# Patient Record
Sex: FEMALE | Race: WHITE | NOT HISPANIC OR LATINO | Employment: FULL TIME | URBAN - METROPOLITAN AREA
[De-identification: names, ages, dates, MRNs, and addresses within clinical notes are randomized per-mention and may not be internally consistent; named-entity substitution may affect disease eponyms.]

---

## 2023-05-18 ENCOUNTER — OFFICE VISIT (OUTPATIENT)
Dept: OBSTETRICS AND GYNECOLOGY | Facility: CLINIC | Age: 23
End: 2023-05-18

## 2023-05-18 VITALS
DIASTOLIC BLOOD PRESSURE: 82 MMHG | BODY MASS INDEX: 32.76 KG/M2 | SYSTOLIC BLOOD PRESSURE: 122 MMHG | WEIGHT: 178 LBS | HEIGHT: 62 IN

## 2023-05-18 DIAGNOSIS — Z11.3 SCREEN FOR STD (SEXUALLY TRANSMITTED DISEASE): Primary | ICD-10-CM

## 2023-05-18 DIAGNOSIS — Z76.89 ENCOUNTER TO ESTABLISH CARE WITH NEW DOCTOR: ICD-10-CM

## 2023-05-18 DIAGNOSIS — Z30.017 ENCOUNTER FOR INITIAL PRESCRIPTION OF IMPLANTABLE SUBDERMAL CONTRACEPTIVE: ICD-10-CM

## 2023-05-18 LAB
B-HCG UR QL: NEGATIVE
BILIRUB BLD-MCNC: NEGATIVE MG/DL
CLARITY, POC: CLEAR
COLOR UR: YELLOW
EXPIRATION DATE: NORMAL
GLUCOSE UR STRIP-MCNC: NEGATIVE MG/DL
INTERNAL NEGATIVE CONTROL: NORMAL
INTERNAL POSITIVE CONTROL: NORMAL
KETONES UR QL: NEGATIVE
LEUKOCYTE EST, POC: NEGATIVE
Lab: NORMAL
NITRITE UR-MCNC: NEGATIVE MG/ML
PH UR: 5 [PH] (ref 5–8)
PROT UR STRIP-MCNC: NEGATIVE MG/DL
RBC # UR STRIP: NEGATIVE /UL
SP GR UR: 1 (ref 1–1.03)
UROBILINOGEN UR QL: NORMAL

## 2023-05-18 NOTE — PROGRESS NOTES
New GYN Exam    CC- Here for discussion of birth control and to establish care    Alondra Montoya is a 22 y.o. female new patient who presents for discussion of birth control and to establish care. She is on OCPS now and has tried several types. Her first one caused BTB and her second one caused low libido and weight gain. Periods are regular every 28-30 days, lasting 6 days. She is SA with one partner. They do not use condoms. She is most interested in Nexplanon.     OB History        0    Para   0    Term   0       0    AB   0    Living   0       SAB   0    IAB   0    Ectopic   0    Molar   0    Multiple   0    Live Births   0          Obstetric Comments   No plans             Menarche: 14  Current contraception: OCP (estrogen/progesterone)  History of abnormal Pap smear: never had one  History of abnormal mammogram: never had one  Family history of uterine, colon or ovarian cancer: no  Family history of breast cancer: yes - 2 patneral great aunts  H/o STDs: none  Last pap:never  Gardasil:completed  JORGE: none    Health Maintenance   Topic Date Due   • HPV VACCINES (1 - 2-dose series) Never done   • COVID-19 Vaccine (3 - Booster for Moderna series) 2021   • TDAP/TD VACCINES (1 - Tdap) Never done   • HEPATITIS C SCREENING  Never done   • ANNUAL PHYSICAL  Never done   • INFLUENZA VACCINE  2023   • CHLAMYDIA SCREENING  2024   • MENINGOCOCCAL VACCINE  Completed   • Pneumococcal Vaccine 0-64  Aged Out       History reviewed. No pertinent past medical history.    Past Surgical History:   Procedure Laterality Date   • WISDOM TOOTH EXTRACTION           Current Outpatient Medications:   •  Cryselle-28 0.3-30 MG-MCG per tablet, , Disp: , Rfl:     No Known Allergies    Social History     Tobacco Use   • Smoking status: Never   • Smokeless tobacco: Never   Vaping Use   • Vaping Use: Never used   Substance Use Topics   • Alcohol use: Never   • Drug use: Never       Family History   Problem Relation  "Age of Onset   • Breast cancer Neg Hx    • Ovarian cancer Neg Hx    • Uterine cancer Neg Hx    • Colon cancer Neg Hx    • Deep vein thrombosis Neg Hx    • Pulmonary embolism Neg Hx    • Birth defects Neg Hx    • Mental retardation Neg Hx        Review of Systems   Constitutional: Positive for activity change. Negative for appetite change, fatigue, fever and unexpected weight change.   Eyes: Negative for photophobia and visual disturbance.   Respiratory: Negative for cough and shortness of breath.    Cardiovascular: Negative for chest pain and palpitations.   Gastrointestinal: Negative for abdominal distention, abdominal pain, constipation, diarrhea and nausea.   Endocrine: Negative for cold intolerance and heat intolerance.   Genitourinary: Negative for dyspareunia, dysuria, menstrual problem, pelvic pain, vaginal bleeding, vaginal discharge and vaginal pain.   Musculoskeletal: Negative for back pain.   Skin: Negative for color change and rash.   Neurological: Negative for headaches.   Hematological: Negative for adenopathy. Does not bruise/bleed easily.   Psychiatric/Behavioral: Negative for dysphoric mood. The patient is nervous/anxious.        /82   Ht 157.5 cm (62\")   Wt 80.7 kg (178 lb)   LMP 05/01/2023   Breastfeeding No   BMI 32.56 kg/m²     Physical Exam  Vitals and nursing note reviewed. Exam conducted with a chaperone present.   Constitutional:       Appearance: Normal appearance. She is well-developed.   HENT:      Head: Normocephalic and atraumatic.   Eyes:      General: No scleral icterus.     Conjunctiva/sclera: Conjunctivae normal.   Neck:      Thyroid: No thyromegaly.   Cardiovascular:      Rate and Rhythm: Normal rate and regular rhythm.   Pulmonary:      Effort: Pulmonary effort is normal.      Breath sounds: Normal breath sounds.   Abdominal:      General: Bowel sounds are normal. There is no distension.      Palpations: Abdomen is soft. There is no mass.      Tenderness: There is no " abdominal tenderness. There is no guarding or rebound.      Hernia: No hernia is present.   Musculoskeletal:      Cervical back: Neck supple.   Skin:     General: Skin is warm and dry.   Neurological:      Mental Status: She is alert and oriented to person, place, and time.   Psychiatric:         Mood and Affect: Mood normal.         Behavior: Behavior normal.         Thought Content: Thought content normal.         Judgment: Judgment normal.               Assessment/Plan  1) Visit to establish care.   2) GYN HM: plan age 23  SBE demonstrated and encouraged.  3) STD screening: declines Condoms encouraged. Check urine C/G/T  4) Contraception: Discussed with patient at length risk, benefits and alternatives to all contraceptive options, including oral contraceptive pills (both combination and progesterone only), vaginal rings, patches, Dep Provera, condoms, diaphragm, cervical caps, as well as long active but reversible forms such as Nexplanon and all IUD’s.  Differences in birth control and cycle control between methods were outlined along with correct usage for each method.  After discussion, the patient is most interest in Nexplanon. R/B/A of Nexplanon were discussed including DUB side effect and weight gain. Will order and place on cycle  5) Family Planning: family planning: no plans at present , encourage folic acid daily  6) Diet and Exercise discussed  7) Smoking Status: No  8) Social: no issues  9) MMG- plan age 42  10)Follow up prn or 1 year       Diagnoses and all orders for this visit:    1. Screen for STD (sexually transmitted disease) (Primary)  -     POC Urinalysis Dipstick  -     POC Pregnancy, Urine  -     Chlamydia trachomatis, Neisseria gonorrhoeae, Trichomonas vaginalis, PCR - Urine, Urine, Random Void    2. Encounter to establish care with new doctor    3. Encounter for initial prescription of implantable subdermal contraceptive          Kellen Junior MD  05/18/2023  12:19 EDT

## 2023-05-22 LAB
C TRACH RRNA SPEC QL NAA+PROBE: NEGATIVE
N GONORRHOEA RRNA SPEC QL NAA+PROBE: NEGATIVE
T VAGINALIS RRNA SPEC QL NAA+PROBE: NEGATIVE

## 2023-06-01 NOTE — PROGRESS NOTES
Nexplanon covered under medical benefits, awaiting device shipment. Once delivered I will call the patient to schedule

## 2023-06-19 ENCOUNTER — OFFICE VISIT (OUTPATIENT)
Dept: OBSTETRICS AND GYNECOLOGY | Facility: CLINIC | Age: 23
End: 2023-06-19
Payer: COMMERCIAL

## 2023-06-19 VITALS
HEIGHT: 62 IN | BODY MASS INDEX: 33.49 KG/M2 | WEIGHT: 182 LBS | SYSTOLIC BLOOD PRESSURE: 132 MMHG | DIASTOLIC BLOOD PRESSURE: 82 MMHG

## 2023-06-19 DIAGNOSIS — Z13.89 SCREENING FOR GENITOURINARY CONDITION: ICD-10-CM

## 2023-06-19 DIAGNOSIS — Z30.017 NEXPLANON INSERTION: Primary | ICD-10-CM

## 2023-06-19 LAB
B-HCG UR QL: NEGATIVE
EXPIRATION DATE: NORMAL
INTERNAL NEGATIVE CONTROL: NORMAL
INTERNAL POSITIVE CONTROL: NORMAL
Lab: NORMAL

## 2023-06-19 PROCEDURE — 11981 INSERTION DRUG DLVR IMPLANT: CPT | Performed by: OBSTETRICS & GYNECOLOGY

## 2023-06-19 PROCEDURE — 81025 URINE PREGNANCY TEST: CPT | Performed by: OBSTETRICS & GYNECOLOGY

## 2023-06-19 NOTE — PROGRESS NOTES
Nexplanon Insertion:    Procedures      Pre Dx: 1) Nexplanon Insertion   Post Dx: 1) Nexplanon Insertion  Urine pregnancy test was negative.  Patient is on her cycle.   Pt currently uses:OCP (estrogen/progesterone)    Risks, benefits, alternatives explained. All questions answered. Consents signed.  The area for insertion prepped with betadine in a sterile fashion. About 3 mL of 1% lidocaine.     The insertion site was identified approximately 6-8 cm proximal to the elbow crease in the underside of the left upper  arm overlying the groove between the biceps and triceps muscles. The skin at the insertion site was stretched by my thumb and index finger. Then inserted the needle tip, bevel side up, at an angle not greater than 20° to the skin surface, just until the skin was penetrated. The applicator was then lowered so that it was parallel to the arm. To minimize the chance of deep incision, the skin was tented upwards with the tip of the needle. The needle was then gently inserted to the full length. Then fixed the device in place on the arm with one hand. I then slowly and carefully retracted the needle cannula back along the length of the device after pushing the release button. The obturator was seen in the device to determine that the nexplanon had been released.     Both the patient and I were easily able to feel the device under the skin. Steri-Strips were applied at the site, and the arm was gently wrapped with gauze. Pt instructed to keep bandage on for 12-24 hrs.    There were no complications.   The patient tolerated the procedure well.     She was given a reminder card. She was advised to use condoms as a backup method for at least a week after insertion.    Expectations, warning signs and limitations reviewed.     Kellen Junior MD    6/19/2023  14:04 EDT

## 2024-06-17 ENCOUNTER — TELEPHONE (OUTPATIENT)
Dept: OBSTETRICS AND GYNECOLOGY | Facility: CLINIC | Age: 24
End: 2024-06-17

## 2024-06-17 NOTE — TELEPHONE ENCOUNTER
Caller: Alondra Montoya    Relationship to patient: Self    Best call back number: [phone]    Patient is needing:      PATIENT CALLED AND CANCELED SAME DAY APPT FOR TODAY.

## 2024-07-03 ENCOUNTER — OFFICE VISIT (OUTPATIENT)
Dept: OBSTETRICS AND GYNECOLOGY | Facility: CLINIC | Age: 24
End: 2024-07-03
Payer: COMMERCIAL

## 2024-07-03 VITALS
WEIGHT: 159 LBS | DIASTOLIC BLOOD PRESSURE: 78 MMHG | BODY MASS INDEX: 29.26 KG/M2 | HEIGHT: 62 IN | SYSTOLIC BLOOD PRESSURE: 124 MMHG

## 2024-07-03 DIAGNOSIS — Z01.419 PAP SMEAR, AS PART OF ROUTINE GYNECOLOGICAL EXAMINATION: ICD-10-CM

## 2024-07-03 DIAGNOSIS — Z30.013 ENCOUNTER FOR PRESCRIPTION FOR PROGESTIN-ONLY INJECTED CONTRACEPTIVE: ICD-10-CM

## 2024-07-03 DIAGNOSIS — Z11.3 ROUTINE SCREENING FOR STI (SEXUALLY TRANSMITTED INFECTION): ICD-10-CM

## 2024-07-03 DIAGNOSIS — Z97.5 NEXPLANON IN PLACE: ICD-10-CM

## 2024-07-03 DIAGNOSIS — N92.0 MENORRHAGIA WITH REGULAR CYCLE: ICD-10-CM

## 2024-07-03 DIAGNOSIS — Z01.419 ROUTINE GYNECOLOGICAL EXAMINATION: Primary | ICD-10-CM

## 2024-07-03 PROBLEM — N94.6 DYSMENORRHEA: Status: ACTIVE | Noted: 2024-07-03

## 2024-07-03 PROCEDURE — 99395 PREV VISIT EST AGE 18-39: CPT | Performed by: NURSE PRACTITIONER

## 2024-07-03 PROCEDURE — 99213 OFFICE O/P EST LOW 20 MIN: CPT | Performed by: NURSE PRACTITIONER

## 2024-07-03 RX ORDER — DROSPIRENONE 4 MG/1
1 TABLET, FILM COATED ORAL DAILY
Qty: 28 TABLET | Refills: 2 | COMMUNITY
Start: 2024-07-03

## 2024-07-03 NOTE — PROGRESS NOTES
GYN Annual Exam     Chief Complaint   Patient presents with    Gynecologic Exam       Alondra Montoya is a 23 y.o. female who presents for annual well woman exam. She is new to me - yes, but is an established patient of this practice. She is currently not sexually active; uses condoms when is SA. Currently using Nexplanon (placed 2023) for contraception. She is happy with her contraception: No.  No concerns for several months after Nexplanon was placed but has recently reverted back to how cycles were while on the pill; pain with heavy bleeding.  Originally started on ocp for dysmenorrhea; caused weight gain, low libido and BTB.    Periods are irregular, lasting unknown days. Dysmenorrhea: moderate, occurring throughout cycle. Cyclic symptoms include none.   Performing SBE: yes    Family hx of thyroid disease; desires labs.     HPI    OB History          0    Para   0    Term   0       0    AB   0    Living   0         SAB   0    IAB   0    Ectopic   0    Molar   0    Multiple   0    Live Births   0          Obstetric Comments   No plans               Last Pap: first one today  Last Mammogram: N/A  Last Colonoscopy: N/A  History of abnormal Pap smear:  N/A  Family history of uterine, colon or ovarian cancer: no  Family history of breast cancer: yes - paternal great-aunt x 2  History of abnormal mammogram:  N/A  Gardasil Vaccine: unsure    History reviewed. No pertinent past medical history.    Past Surgical History:   Procedure Laterality Date    WISDOM TOOTH EXTRACTION           Current Outpatient Medications:     Drospirenone (Slynd) 4 MG tablet, Take 1 tablet by mouth Daily., Disp: 28 tablet, Rfl: 2    Etonogestrel (Nexplanon) 68 MG implant subdermal implant, Inject 1 each into the appropriate area of the skin as directed by provider 1 (One) Time., Disp: , Rfl:     No Known Allergies    Social History     Tobacco Use    Smoking status: Never    Smokeless tobacco: Never   Vaping Use    Vaping status:  "Never Used   Substance Use Topics    Alcohol use: Never    Drug use: Never       Family History   Problem Relation Age of Onset    Thyroid disease Paternal Grandmother     Breast cancer Paternal Aunt     Ovarian cancer Neg Hx     Uterine cancer Neg Hx     Colon cancer Neg Hx     Deep vein thrombosis Neg Hx     Pulmonary embolism Neg Hx     Birth defects Neg Hx     Mental retardation Neg Hx        Review of Systems   Genitourinary:  Positive for menstrual problem and pelvic pain. Negative for breast discharge, breast lump and breast pain.         Yes-  tested for chlamydia    /78   Ht 157.5 cm (62\")   Wt 72.1 kg (159 lb)   LMP 06/15/2024   BMI 29.08 kg/m²     Physical Exam  Vitals reviewed.   Constitutional:       General: She is awake. She is not in acute distress.     Appearance: She is not ill-appearing.   HENT:      Head: Normocephalic and atraumatic.   Eyes:      Conjunctiva/sclera: Conjunctivae normal.   Pulmonary:      Effort: Pulmonary effort is normal. No respiratory distress.   Chest:   Breasts:     Right: Normal.      Left: Normal.   Abdominal:      Palpations: Abdomen is soft. There is no mass.      Tenderness: There is no abdominal tenderness.   Genitourinary:     General: Normal vulva.      Exam position: Supine.      Pubic Area: No rash.       Labia:         Right: No rash.         Left: No rash.       Urethra: No urethral lesion.      Vagina: Normal.      Cervix: Normal.      Uterus: Normal.       Adnexa: Right adnexa normal and left adnexa normal.   Musculoskeletal:      Cervical back: Neck supple. No rigidity.   Lymphadenopathy:      Upper Body:      Right upper body: No axillary adenopathy.      Left upper body: No axillary adenopathy.      Lower Body: No right inguinal adenopathy. No left inguinal adenopathy.   Skin:     General: Skin is warm and dry.      Capillary Refill: Capillary refill takes less than 2 seconds.   Neurological:      Mental Status: She is alert and oriented to " person, place, and time.   Psychiatric:         Mood and Affect: Mood and affect normal.         Behavior: Behavior normal.            Assessment     Well woman exam   Contraception management  STI screen  menorrhagia    Plan   Well woman exam: Pap collected- Yes. Instructed on how to perform SBE. Recommend MVI daily.    Breast Health: Clinical breast exam & Self breast awareness.   Colon health:  colonoscopy due at 47 y.o.  Contraception: abstinence  STD: Enc condoms. STD screen today- Yes. Pap   Gardasil: unsure if she received; Discussed with patient risks, benefits and alternatives to the Gardasil vaccination.  The vaccine is administered in the arm in a series of 3 shots at 0, 2 and 6 months.  It provides a 70% to 90% reduction in HPV related diseases such as abnormal Pap smears, genital warts and cervical cancer.  The vaccine was originally approved for ages 9-26, but is recently been expanded to the age of 45.  The most common side effects are pain at the injection site and fainting.  Any and all adverse side effects are tracked by the FDA and are available on their website for review.  After consideration, the patient is undecided.   Smoking status:  No  Body mass index is 29.08 kg/m². Diet and exercised discussed.  9.    Menorrhagia- check thyroid labs; Discussed other options for birth control/dysmenorrhea- not interested in combined ocp or Depo due to incidence of weight gain.  Does not want IUD at this time.  Samples provided of Slynd; Nexplanon in place; recommend to return to office in 3 months for f/u and remove Nexplanon    Follow-up as needed and 3 months for re-evaluation/Nexplanon removal.    I spent 15 minutes caring for Alondra on this date of service. This time includes time spent by me in the following activities: preparing for the visit, reviewing tests, performing a medically appropriate examination and/or evaluation, counseling and educating the patient/family/caregiver, documenting  information in the medical record, ordering medications, and ordering test(s).      I spent 5 minutes on the separately reported service of menorrhagia. This time is not included in the time used to support the E/M service also reported today.     Jaz Molina, APRN  7/8/2024  09:25 EDT

## 2024-07-04 LAB
T3 SERPL-MCNC: 152 NG/DL (ref 71–180)
T4 FREE SERPL-MCNC: 1.21 NG/DL (ref 0.82–1.77)
THYROPEROXIDASE AB SERPL-ACNC: 10 IU/ML (ref 0–34)
TSH SERPL DL<=0.005 MIU/L-ACNC: 1.32 UIU/ML (ref 0.45–4.5)

## 2024-07-08 LAB
C TRACH RRNA CVX QL NAA+PROBE: NEGATIVE
CONV .: NORMAL
CYTOLOGIST CVX/VAG CYTO: NORMAL
CYTOLOGY CVX/VAG DOC CYTO: NORMAL
CYTOLOGY CVX/VAG DOC THIN PREP: NORMAL
DX ICD CODE: NORMAL
Lab: NORMAL
N GONORRHOEA RRNA CVX QL NAA+PROBE: NEGATIVE
OTHER STN SPEC: NORMAL
STAT OF ADQ CVX/VAG CYTO-IMP: NORMAL
T VAGINALIS RRNA SPEC QL NAA+PROBE: NEGATIVE

## 2024-10-03 ENCOUNTER — TELEPHONE (OUTPATIENT)
Dept: OBSTETRICS AND GYNECOLOGY | Facility: CLINIC | Age: 24
End: 2024-10-03

## 2024-10-03 NOTE — TELEPHONE ENCOUNTER
Caller: JONNY OCHOA     Relationship:  SELF    Best call back number: [phone]    PATIENT CALLED REQUESTING TO CANCEL SAME DAY APPT.    Did the patient call AFTER the start time of their scheduled appointment?   NO    Was the patient's appointment rescheduled?  YES

## 2024-10-11 ENCOUNTER — OFFICE VISIT (OUTPATIENT)
Dept: OBSTETRICS AND GYNECOLOGY | Facility: CLINIC | Age: 24
End: 2024-10-11
Payer: COMMERCIAL

## 2024-10-11 VITALS
WEIGHT: 165 LBS | HEIGHT: 62 IN | DIASTOLIC BLOOD PRESSURE: 72 MMHG | BODY MASS INDEX: 30.36 KG/M2 | SYSTOLIC BLOOD PRESSURE: 122 MMHG

## 2024-10-11 DIAGNOSIS — Z30.46 ENCOUNTER FOR NEXPLANON REMOVAL: Primary | ICD-10-CM

## 2024-10-11 NOTE — PROGRESS NOTES
Procedure: Nexplanon removal    Chief Complaint: Nexplanon removal    Procedures      Pre Dx: 1) Nexplanon removal  Post Dx: 1) Nexplanon removal     The risks, benefits, and alternatives to remove the device have been discussed with the patient at length. All of her questions are answered. She is aware of the need for alternative means of contraception if desired. Verbal informed consent is obtained.    Able to easily palpate the device in the left arm. Additional imaging was not required. The device feels freely mobile and easily accessible. She was placed in the  supine position with her arm elevated at her side. The skin over this site is prepped with Betadine. 2-3 mL of 1% lidocaine with no epinephrine was injected.  Downward pressure was applied on the end of the implant nearest the axilla, and a 2-3 mm incision was made in a longitudinal direction of the arm at the tip of the implant closest to the elbow. The implant was then pushed gently toward the incision until the tip was visible. The fibrous capsule was opened with blunt dissection using a hemostat. The implant was grasp with a hemostat and was easily removed intact. It measured a  full 4 cm in length.    Tolerated well  No apparent complications    The skin was cleansed and dried. A Steri-Strip was applied. The arm was gently wrapped with Kerlex gauze. The patient had normal strength and sensation in her upper extremity. There was no significant bleeding. There were no complications. The patient was advised about proper care of the dressings. She was advised to call or return for complications such as fever, signs of infection, increased pain, or any other concerns.    Warning signs, limitations and expectations reviewed.     Diagnoses and all orders for this visit:    1. Encounter for Nexplanon removal (Primary)    Declined birth control at this time.  Enc condoms with sexual encounters.    RTO Return as needed.    Jaz Molina,  APRN    10/11/2024  12:49 EDT

## 2024-10-16 ENCOUNTER — TELEPHONE (OUTPATIENT)
Dept: OBSTETRICS AND GYNECOLOGY | Facility: CLINIC | Age: 24
End: 2024-10-16

## 2024-10-16 NOTE — TELEPHONE ENCOUNTER
Caller: Alondra Montoya    Relationship to patient: Self    Best call back number: [phone] (home)       Patient is needing: PT WOULD LIKE TO HAVE THE NOTE PUT INTO MY CHART SO THAT SHE CAN PRINT IT OUT, OR FORWARD NOTE.

## 2024-10-16 NOTE — TELEPHONE ENCOUNTER
Provider: Jaz Molina APRN     Caller: JONNY OCHOA    Relationship to Patient: SELF        Reason for Call: PT IS REQUESTING THE NOTE FROM 10-11-24 VISIT [facility]

## 2025-08-18 ENCOUNTER — OFFICE VISIT (OUTPATIENT)
Dept: OBSTETRICS AND GYNECOLOGY | Facility: CLINIC | Age: 25
End: 2025-08-18
Payer: COMMERCIAL

## 2025-08-18 VITALS
SYSTOLIC BLOOD PRESSURE: 122 MMHG | DIASTOLIC BLOOD PRESSURE: 62 MMHG | WEIGHT: 153 LBS | HEIGHT: 63 IN | BODY MASS INDEX: 27.11 KG/M2

## 2025-08-18 DIAGNOSIS — N94.6 DYSMENORRHEA: ICD-10-CM

## 2025-08-18 DIAGNOSIS — Z01.419 ROUTINE GYNECOLOGICAL EXAMINATION: Primary | ICD-10-CM

## 2025-08-18 DIAGNOSIS — N92.0 MENORRHAGIA WITH REGULAR CYCLE: ICD-10-CM

## 2025-08-18 DIAGNOSIS — Z30.011 ENCOUNTER FOR INITIAL PRESCRIPTION OF CONTRACEPTIVE PILLS: ICD-10-CM

## 2025-08-18 DIAGNOSIS — Z11.3 ROUTINE SCREENING FOR STI (SEXUALLY TRANSMITTED INFECTION): ICD-10-CM

## 2025-08-19 PROBLEM — N92.0 MENORRHAGIA WITH REGULAR CYCLE: Status: ACTIVE | Noted: 2025-08-19

## 2025-08-19 LAB
ERYTHROCYTE [DISTWIDTH] IN BLOOD BY AUTOMATED COUNT: 12.7 % (ref 11.7–15.4)
FERRITIN SERPL-MCNC: 19 NG/ML (ref 15–150)
HCT VFR BLD AUTO: 37.4 % (ref 34–46.6)
HGB BLD-MCNC: 12.4 G/DL (ref 11.1–15.9)
MCH RBC QN AUTO: 30.2 PG (ref 26.6–33)
MCHC RBC AUTO-ENTMCNC: 33.2 G/DL (ref 31.5–35.7)
MCV RBC AUTO: 91 FL (ref 79–97)
PLATELET # BLD AUTO: 268 X10E3/UL (ref 150–450)
RBC # BLD AUTO: 4.1 X10E6/UL (ref 3.77–5.28)
T3 SERPL-MCNC: 133 NG/DL (ref 71–180)
T4 FREE SERPL-MCNC: 1.1 NG/DL (ref 0.82–1.77)
THYROPEROXIDASE AB SERPL-ACNC: 17 IU/ML (ref 0–34)
TSH SERPL DL<=0.005 MIU/L-ACNC: 1.55 UIU/ML (ref 0.45–4.5)
WBC # BLD AUTO: 8.6 X10E3/UL (ref 3.4–10.8)
